# Patient Record
Sex: FEMALE | Race: BLACK OR AFRICAN AMERICAN
[De-identification: names, ages, dates, MRNs, and addresses within clinical notes are randomized per-mention and may not be internally consistent; named-entity substitution may affect disease eponyms.]

---

## 2021-05-26 ENCOUNTER — DASHBOARD ENCOUNTERS (OUTPATIENT)
Age: 31
End: 2021-05-26

## 2021-05-26 ENCOUNTER — OFFICE VISIT (OUTPATIENT)
Dept: URBAN - METROPOLITAN AREA CLINIC 109 | Facility: CLINIC | Age: 31
End: 2021-05-26
Payer: COMMERCIAL

## 2021-05-26 ENCOUNTER — WEB ENCOUNTER (OUTPATIENT)
Dept: URBAN - METROPOLITAN AREA CLINIC 109 | Facility: CLINIC | Age: 31
End: 2021-05-26

## 2021-05-26 DIAGNOSIS — R10.11 ABDOMINAL PAIN, RIGHT UPPER QUADRANT: ICD-10-CM

## 2021-05-26 DIAGNOSIS — K82.0 GALLBLADDER CONTRACTION: ICD-10-CM

## 2021-05-26 PROBLEM — 249569004: Status: ACTIVE | Noted: 2021-05-26

## 2021-05-26 PROCEDURE — 99244 OFF/OP CNSLTJ NEW/EST MOD 40: CPT | Performed by: INTERNAL MEDICINE

## 2021-05-26 NOTE — HPI-TODAY'S VISIT:
The patient presents as referral from Dr Yash Colbert for an evaluation of RUQ abd pain and contracted gallbladder on US.  A copy will be sent to the referring provider.  pt reports having ruq/right sided sharp/stabbing pains ~2 months ago.  occurred with ambulation at work.  lasted several days,  had US by pcp which showed possible contracted GB, but no stones, inflammation or other abnormalities.  denies association with po intake however has avoid greasy meals since then.  overall sx's are better, no pain for few weeks.  she notices with long ambulation periods.  no n/v, pain with meals, etc.

## 2021-07-28 ENCOUNTER — OFFICE VISIT (OUTPATIENT)
Dept: URBAN - METROPOLITAN AREA CLINIC 109 | Facility: CLINIC | Age: 31
End: 2021-07-28

## 2023-08-13 NOTE — PHYSICAL EXAM CHEST:
no lesions,  no deformities,  no traumatic injuries,  no significant scars are present,  chest wall non-tender,  no masses present, breathing is unlabored without accessory muscle use,normal breath sounds patient